# Patient Record
Sex: MALE | Race: ASIAN | NOT HISPANIC OR LATINO
[De-identification: names, ages, dates, MRNs, and addresses within clinical notes are randomized per-mention and may not be internally consistent; named-entity substitution may affect disease eponyms.]

---

## 2020-07-24 ENCOUNTER — APPOINTMENT (OUTPATIENT)
Dept: UROLOGY | Facility: CLINIC | Age: 70
End: 2020-07-24
Payer: MEDICARE

## 2020-07-24 VITALS
OXYGEN SATURATION: 99 % | DIASTOLIC BLOOD PRESSURE: 68 MMHG | SYSTOLIC BLOOD PRESSURE: 98 MMHG | WEIGHT: 113 LBS | TEMPERATURE: 97.9 F | RESPIRATION RATE: 18 BRPM | HEART RATE: 99 BPM | BODY MASS INDEX: 18.24 KG/M2

## 2020-07-24 DIAGNOSIS — Z00.00 ENCOUNTER FOR GENERAL ADULT MEDICAL EXAMINATION W/OUT ABNORMAL FINDINGS: ICD-10-CM

## 2020-07-24 DIAGNOSIS — N40.1 BENIGN PROSTATIC HYPERPLASIA WITH LOWER URINARY TRACT SYMPMS: ICD-10-CM

## 2020-07-24 DIAGNOSIS — N13.8 BENIGN PROSTATIC HYPERPLASIA WITH LOWER URINARY TRACT SYMPMS: ICD-10-CM

## 2020-07-24 DIAGNOSIS — E11.9 TYPE 2 DIABETES MELLITUS W/OUT COMPLICATIONS: ICD-10-CM

## 2020-07-24 PROCEDURE — 99204 OFFICE O/P NEW MOD 45 MIN: CPT

## 2020-07-24 NOTE — ADDENDUM
[FreeTextEntry1] : Entered by Gisele Barrett, acting as scribe for Dr. Nicolas Lopez.\par \par The documentation recorded by the scribe accurately reflects the service I personally performed and the decisions made by me.\par

## 2020-07-24 NOTE — LETTER BODY
[FreeTextEntry1] : Wally Pantoja MD\par [address]\[place] [address]\par P: [phone]\par \par Dear Dr. Pantoja,\par \par REASON FOR VISIT: BPH\par \par This is a 70 year-old gentleman with lower urinary tract symptoms and BPH. Patient is here today for evaluation. He was last seen four years ago. Patient has a normalized PSA and he reports weak uroflow, frequency, and hesitancy despite medical therapy. He denies any hematuria or urinary incontinence. His symptoms are aggravated by hydration. He denies any alleviating factors. He is currently taking Flomax without improvement. He denies any pain. All other review of systems are negative. He has no cancer in his family medical history. He has no previous surgical history. Past medical history, family history and social history were inquired and were noncontributory to current condition. The patient does not use tobacco or drink alcohol. Medications and allergies were reviewed. He has no known allergies to medication. \par \par On examination, the patient is a healthy-appearing gentleman in no acute distress. He is alert and oriented follows commands. He has normal mood and affect. He is normocephalic. Neck is supple. Oral no thrush. Respirations are unlabored. His abdomen is soft and nontender. Bladder is nonpalpable. No CVA tenderness. Neurologically he is grossly intact. No peripheral edema. Skin without gross abnormality. He has normal male external genitalia. Normal meatus. Bilateral testes are descended intrascrotally and normal to palpation. On rectal examination, there is normal sphincter tone. The prostate is clinically benign without focal induration or nodularity.\par \par ASSESSMENT: BPH\par \par I counseled the patient on the various etiology of his symptoms. I discussed the natural history of BPH and the treatment options available. I discussed the options of conservative management with fluid in dietary restrictions, herbal therapy, medical therapy, and minimally invasive procedures.  Risk and benefits were discussed. I answered his questions. Given his weak urine flow despite taking Flomax, I recommended he increase the dosage and take Flomax twice a day (BID). I discussed the potential side effects of the medication. I counseled the patient on its use and side effects. If the patient develops any side effects, the patient will discontinue the medication and contact me. He may also consider cystoscopy to evaluate the urinary outlet. Risks and alternatives were discussed. I answered the patient questions. The patient will follow-up in one month and will contact me with any questions or concerns. Thank you for the opportunity to participate in the care of Mr. VILLA. I will keep you updated on his progress.\par \par Plan: Flomax BID. Follow up in one month.

## 2020-07-26 LAB
ANION GAP SERPL CALC-SCNC: 14 MMOL/L
APPEARANCE: CLEAR
BACTERIA: NEGATIVE
BILIRUBIN URINE: NEGATIVE
BLOOD URINE: NEGATIVE
BUN SERPL-MCNC: 20 MG/DL
CALCIUM SERPL-MCNC: 9.4 MG/DL
CHLORIDE SERPL-SCNC: 99 MMOL/L
CO2 SERPL-SCNC: 27 MMOL/L
COLOR: YELLOW
CREAT SERPL-MCNC: 0.94 MG/DL
ESTIMATED AVERAGE GLUCOSE: 189 MG/DL
GLUCOSE QUALITATIVE U: ABNORMAL
GLUCOSE SERPL-MCNC: 267 MG/DL
HBA1C MFR BLD HPLC: 8.2 %
HYALINE CASTS: 0 /LPF
KETONES URINE: NEGATIVE
LEUKOCYTE ESTERASE URINE: NEGATIVE
MICROSCOPIC-UA: NORMAL
NITRITE URINE: NEGATIVE
PH URINE: 6
POTASSIUM SERPL-SCNC: 4.8 MMOL/L
PROTEIN URINE: NORMAL
PSA FREE FLD-MCNC: 40 %
PSA FREE SERPL-MCNC: 0.41 NG/ML
PSA SERPL-MCNC: 1.01 NG/ML
RED BLOOD CELLS URINE: 1 /HPF
SODIUM SERPL-SCNC: 140 MMOL/L
SPECIFIC GRAVITY URINE: 1.02
SQUAMOUS EPITHELIAL CELLS: 0 /HPF
UROBILINOGEN URINE: NORMAL
WHITE BLOOD CELLS URINE: 2 /HPF

## 2020-08-25 ENCOUNTER — APPOINTMENT (OUTPATIENT)
Dept: UROLOGY | Facility: CLINIC | Age: 70
End: 2020-08-25
Payer: MEDICARE

## 2020-08-25 VITALS
SYSTOLIC BLOOD PRESSURE: 115 MMHG | WEIGHT: 110 LBS | OXYGEN SATURATION: 99 % | BODY MASS INDEX: 17.75 KG/M2 | HEART RATE: 81 BPM | TEMPERATURE: 97.6 F | DIASTOLIC BLOOD PRESSURE: 77 MMHG | RESPIRATION RATE: 18 BRPM

## 2020-08-25 PROCEDURE — 99214 OFFICE O/P EST MOD 30 MIN: CPT

## 2020-08-29 NOTE — LETTER BODY
[FreeTextEntry1] : Wally Pantoja MD\par [address]\[place] [address]\par P: [phone]\par \par Dear Dr. Pantoja,\par \par Reason for visit: BPH. \par \par This is a 70 year year-old gentleman with diabetes and chronic BPH. Patient returns today for followup. Patient continues to take Flomax. Patient reports taking the medication regularly without any side effects or difficulties with the medication. Patient reports improvement in his urinary flow. Patient denies any urinary retention or hematuria or changes in health. Patient has no pain. The past medical history and family history and social history are unchanged. All other review of systems are negative. Patient denies any changes in medications. Medication list was reconciled.\par \par On examination, the patient is a healthy-appearing gentleman in no acute distress. He is alert and oriented follows commands. He has normal mood and affect. He is normocephalic. Oral no thrush. Neck is supple. Respirations are unlabored. His abdomen is soft and nontender. Liver is nonpalpable. Bladder is nonpalpable. No CVA tenderness. Neurologically he is grossly intact. No peripheral edema. Skin without gross abnormality. \par \par His previous A1c was elevated at 8.2.\par \par Assessment: BPH, symptoms improved with Flomax.  Diabetes.\par \par I counseled the patient.  Patient has poor glycemic control.  Diabetes can increase urinary frequency affect urinary discomfort.  I encouraged him to improve his glycemic control.  I renewed his prescription for Flomax today. I encouraged him to continue medication. Risks and alternatives were discussed. I answered the patient questions. The patient will follow-up as directed and will contact me with any questions or concerns. Thank you for the opportunity to participate in the care of Mr. VILLA. I'll keep you updated on his  progress. \par \par Plan: Followup 1 year.  Continue Flomax.

## 2021-07-09 ENCOUNTER — APPOINTMENT (OUTPATIENT)
Dept: UROLOGY | Facility: CLINIC | Age: 71
End: 2021-07-09
Payer: MEDICARE

## 2021-07-09 VITALS
TEMPERATURE: 97.6 F | OXYGEN SATURATION: 96 % | WEIGHT: 111 LBS | SYSTOLIC BLOOD PRESSURE: 102 MMHG | DIASTOLIC BLOOD PRESSURE: 68 MMHG | HEART RATE: 96 BPM | RESPIRATION RATE: 18 BRPM | BODY MASS INDEX: 17.92 KG/M2

## 2021-07-09 DIAGNOSIS — R97.20 ELEVATED PROSTATE, SPECIFIC ANTIGEN [PSA]: ICD-10-CM

## 2021-07-09 PROCEDURE — 99213 OFFICE O/P EST LOW 20 MIN: CPT

## 2021-07-09 NOTE — LETTER BODY
[FreeTextEntry1] : Wally Pantoja MD\par [address]\[place] [address]\par P: [phone]\par \par Dear Dr. Pantoja,\par \par Reason for visit: BPH. \par \par This is a 71 year year-old gentleman with diabetes and chronic BPH. Patient returns today for followup. Patient continues to take Flomax. Patient reports taking the medication regularly without any side effects or difficulties with the medication. Patient reports improvement in his urinary flow. He still complains of urinary frequency.  Patient denies any urinary retention or hematuria or changes in health. Patient has no pain. The past medical history and family history and social history are unchanged. All other review of systems are negative. Patient denies any changes in medications. Medication list was reconciled.\par \par On examination, the patient is a healthy-appearing gentleman in no acute distress. He is alert and oriented follows commands. He has normal mood and affect. He is normocephalic. Oral no thrush. Neck is supple. Respirations are unlabored. His abdomen is soft and nontender. Liver is nonpalpable. Bladder is nonpalpable. No CVA tenderness. Neurologically he is grossly intact. No peripheral edema. Skin without gross abnormality. \par \par His A1c was elevated at 7.6. His previous A1c was 8.2  His PSA is 1.01, which is within normal limits. \par \par Assessment: BPH, symptoms improved with Flomax. Diabetes.\par \par I counseled the patient. Patient has poor glycemic control. Diabetes can increase urinary frequency affect urinary discomfort. I encouraged him to improve his glycemic control. I also recommended he continue to take Flomax.  I renewed his prescription for Flomax today. I encouraged him to continue medication. He will also obtain PSA today. Risks and alternatives were discussed. I answered the patient questions. The patient will follow-up as directed and will contact me with any questions or concerns. Thank you for the opportunity to participate in the care of Mr. VILLA. I'll keep you updated on his progress. \par \par Plan: Continue Flomax. PSA. Followup 1 year.

## 2021-07-09 NOTE — HISTORY OF PRESENT ILLNESS
[FreeTextEntry1] : Please refer to URO Consult note \par \par fu bph \par  diab 7.6\par  the cause of his \par urinary freq \par improve glycemic control \par flomax \par fu 1 yr \par

## 2021-07-09 NOTE — ADDENDUM
[FreeTextEntry1] : Entered by Gloria Arboleda, acting as scribe for Dr. Nicolas Lopez.\par \par The documentation recorded by the scribe accurately reflects the service I personally performed and the decisions made by me.

## 2021-07-13 ENCOUNTER — NON-APPOINTMENT (OUTPATIENT)
Age: 71
End: 2021-07-13

## 2021-07-13 LAB
PSA FREE FLD-MCNC: 47 %
PSA FREE SERPL-MCNC: 0.39 NG/ML
PSA SERPL-MCNC: 0.83 NG/ML

## 2022-07-12 ENCOUNTER — APPOINTMENT (OUTPATIENT)
Dept: UROLOGY | Facility: CLINIC | Age: 72
End: 2022-07-12

## 2022-09-27 ENCOUNTER — APPOINTMENT (OUTPATIENT)
Dept: UROLOGY | Facility: CLINIC | Age: 72
End: 2022-09-27

## 2022-09-27 VITALS
SYSTOLIC BLOOD PRESSURE: 109 MMHG | TEMPERATURE: 97.3 F | WEIGHT: 111.1 LBS | OXYGEN SATURATION: 96 % | HEART RATE: 91 BPM | BODY MASS INDEX: 17.93 KG/M2 | DIASTOLIC BLOOD PRESSURE: 68 MMHG | RESPIRATION RATE: 18 BRPM

## 2022-09-27 PROCEDURE — 99214 OFFICE O/P EST MOD 30 MIN: CPT

## 2022-09-27 NOTE — LETTER BODY
[FreeTextEntry1] : Wally Pantoja MD\par [address]\[place] [address]\par P: [phone]\par \par Dear Dr. Pantoja,\par \par Reason for visit: BPH. \par \par This is a 72 year year-old gentleman with diabetes and chronic BPH. Patient returns today for followup. Patient continues to take Flomax regularly without any side effects or difficulties with the medication. Patient reports improvement in his urinary flow with medical therapy. Patient denies any urinary retention or hematuria or changes in health. Patient has no pain. The past medical history and family history and social history are unchanged. All other review of systems are negative. Patient denies any changes in medications. Medication list was reconciled.\par \par On examination, the patient is a healthy-appearing gentleman in no acute distress. He is alert and oriented follows commands. He has normal mood and affect. He is normocephalic. Oral no thrush. Neck is supple. Respirations are unlabored. His abdomen is soft and nontender. Liver is nonpalpable. Bladder is nonpalpable. No CVA tenderness. Neurologically he is grossly intact. No peripheral edema. Skin without gross abnormality. \par \par His BMP from September 2022 demonstrated normal renal function, creatinine 0.84. His A1c was elevated at 8.8. His PSA is 0.9, which is within normal limits. \par \par Assessment: BPH, symptoms improved with Flomax. Diabetes.\par \par I counseled the patient. Patient has poor glycemic control. Diabetes can increase urinary frequency affect urinary discomfort. I encouraged him to improve his glycemic control. I also recommended he continue to take Flomax. I renewed his prescription for Flomax today I encouraged the patient to continue medications regularly as directed. Risks and alternatives were discussed. I answered the patient questions. The patient will follow-up as directed and will contact me with any questions or concerns. Thank you for the opportunity to participate in the care of Mr. VILLA. I'll keep you updated on his progress. \par \par Plan: Continue Flomax. Improve glycemic control. Followup 1 year.

## 2022-09-27 NOTE — ADDENDUM
[FreeTextEntry1] : Entered by MARITZA TOLENTINO, acting as scribe for Dr. Nicolas Lopez.\par The documentation recorded by the scribe accurately reflects the service I personally performed and the decisions made by me.

## 2023-09-26 ENCOUNTER — APPOINTMENT (OUTPATIENT)
Dept: UROLOGY | Facility: CLINIC | Age: 73
End: 2023-09-26
Payer: MEDICARE

## 2023-09-26 VITALS
DIASTOLIC BLOOD PRESSURE: 71 MMHG | OXYGEN SATURATION: 97 % | SYSTOLIC BLOOD PRESSURE: 114 MMHG | RESPIRATION RATE: 18 BRPM | HEART RATE: 88 BPM | WEIGHT: 111.5 LBS | BODY MASS INDEX: 18 KG/M2 | TEMPERATURE: 97.3 F

## 2023-09-26 PROCEDURE — 99214 OFFICE O/P EST MOD 30 MIN: CPT

## 2023-09-26 RX ORDER — TAMSULOSIN HYDROCHLORIDE 0.4 MG/1
0.4 CAPSULE ORAL
Qty: 180 | Refills: 3 | Status: ACTIVE | COMMUNITY
Start: 2020-07-24 | End: 1900-01-01

## 2024-09-24 ENCOUNTER — APPOINTMENT (OUTPATIENT)
Dept: UROLOGY | Facility: CLINIC | Age: 74
End: 2024-09-24
Payer: MEDICARE

## 2024-09-24 VITALS
TEMPERATURE: 98 F | SYSTOLIC BLOOD PRESSURE: 130 MMHG | DIASTOLIC BLOOD PRESSURE: 76 MMHG | RESPIRATION RATE: 18 BRPM | BODY MASS INDEX: 18.72 KG/M2 | WEIGHT: 116 LBS | HEART RATE: 72 BPM | OXYGEN SATURATION: 99 %

## 2024-09-24 DIAGNOSIS — R97.20 ELEVATED PROSTATE, SPECIFIC ANTIGEN [PSA]: ICD-10-CM

## 2024-09-24 DIAGNOSIS — N13.8 BENIGN PROSTATIC HYPERPLASIA WITH LOWER URINARY TRACT SYMPMS: ICD-10-CM

## 2024-09-24 DIAGNOSIS — N40.1 BENIGN PROSTATIC HYPERPLASIA WITH LOWER URINARY TRACT SYMPMS: ICD-10-CM

## 2024-09-24 PROCEDURE — G2211 COMPLEX E/M VISIT ADD ON: CPT

## 2024-09-24 PROCEDURE — 99214 OFFICE O/P EST MOD 30 MIN: CPT

## 2024-09-24 RX ORDER — FINASTERIDE 5 MG/1
5 TABLET, FILM COATED ORAL
Qty: 90 | Refills: 3 | Status: ACTIVE | COMMUNITY
Start: 2024-09-24 | End: 1900-01-01

## 2024-09-24 NOTE — LETTER BODY
[FreeTextEntry1] : Hira Hernández MD [address] [facility] [address], [facility] [place] [id number] [phone]  Dear Dr. Hernández,  Reason for visit: BPH.   This is a 74 year year-old gentleman with diabetes and chronic BPH. Patient returns today for followup. Since his last visit, patient reports decreasing Flomax from BID to QD after developing dizziness while on Flomax BID. He is taking Flomax QD currently with no improvement to his urinary symptoms. Patient denies any urinary retention or hematuria or changes in health. Patient has no pain. Patient has history of diabetes of 7.3 which improved. The past medical history and family history and social history are unchanged. All other review of systems are negative. Patient denies any changes in medications. Medication list was reconciled.    On examination, the patient is a healthy-appearing gentleman in no acute distress. He is alert and oriented follows commands. He has normal mood and affect. He is normocephalic. Oral no thrush. Neck is supple. Respirations are unlabored. His abdomen is soft and nontender. Liver is nonpalpable. Bladder is nonpalpable. No CVA tenderness. Neurologically he is grossly intact. No peripheral edema. Skin without gross abnormality.    His PSA is 1.4 which is stable.   Post-void residual on bladder scan today was 47 cc.  Assessment: BPH. Diabetes.    I counseled the patient. Patient decreased Flomax from BID to QD due to dizziness while on Flomax BID. He is now taking Flomax QD with no improvement to his urinary symptoms. I recommended the patient continue Flomax QD and add Proscar to the regiment. I discussed the potential side effects of the medication. I counseled the patient on its use and side effects. If the patient develops any side effects, the patient will discontinue medication and contact me.  I renewed the patient's prescription for Flomax and Proscar today. I encouraged the patient to continue medications regularly as directed.  PSA is stable. Risks and alternatives were discussed. I answered the patient questions. The patient will follow-up as directed and will contact me with any questions or concerns. Thank you for the opportunity to participate in the care of Mr. VILLA. I'll keep you updated on his progress.    Plan: Trial of Proscar. Continue Flomax QD. PVR. Followup in 1 month.

## 2024-09-24 NOTE — HISTORY OF PRESENT ILLNESS
[FreeTextEntry1] : Follow-up for BPH, on Flomax once daily, taking two capsules RPN because low BP, reports weak urine stream, PCP suggests Proscar that patient did not start yet. PVR 47ml today PSA 1.4 in June 2024  Please refer to URO Consult Note.

## 2024-09-24 NOTE — ADDENDUM
[FreeTextEntry1] : Entered by Gomez De Leon, acting as scribe for Dr. Nicolas Lopez. The documentation recorded by the scribe accurately reflects the service I personally performed and the decisions made by me.

## 2025-03-25 ENCOUNTER — APPOINTMENT (OUTPATIENT)
Dept: UROLOGY | Facility: CLINIC | Age: 75
End: 2025-03-25
Payer: MEDICARE

## 2025-03-25 VITALS
TEMPERATURE: 97 F | HEART RATE: 78 BPM | BODY MASS INDEX: 18.88 KG/M2 | OXYGEN SATURATION: 97 % | DIASTOLIC BLOOD PRESSURE: 68 MMHG | WEIGHT: 117 LBS | SYSTOLIC BLOOD PRESSURE: 111 MMHG | RESPIRATION RATE: 18 BRPM

## 2025-03-25 DIAGNOSIS — R97.20 ELEVATED PROSTATE, SPECIFIC ANTIGEN [PSA]: ICD-10-CM

## 2025-03-25 PROCEDURE — G2211 COMPLEX E/M VISIT ADD ON: CPT

## 2025-03-25 PROCEDURE — 99214 OFFICE O/P EST MOD 30 MIN: CPT

## 2025-03-26 LAB
ANION GAP SERPL CALC-SCNC: 13 MMOL/L
BUN SERPL-MCNC: 22 MG/DL
CALCIUM SERPL-MCNC: 9.7 MG/DL
CHLORIDE SERPL-SCNC: 103 MMOL/L
CO2 SERPL-SCNC: 27 MMOL/L
CREAT SERPL-MCNC: 0.9 MG/DL
EGFRCR SERPLBLD CKD-EPI 2021: 89 ML/MIN/1.73M2
GLUCOSE SERPL-MCNC: 155 MG/DL
POTASSIUM SERPL-SCNC: 5.1 MMOL/L
PSA FREE FLD-MCNC: 44 %
PSA FREE SERPL-MCNC: 0.46 NG/ML
PSA SERPL-MCNC: 1.03 NG/ML
SODIUM SERPL-SCNC: 142 MMOL/L